# Patient Record
Sex: MALE | Race: WHITE | NOT HISPANIC OR LATINO | Employment: OTHER | URBAN - METROPOLITAN AREA
[De-identification: names, ages, dates, MRNs, and addresses within clinical notes are randomized per-mention and may not be internally consistent; named-entity substitution may affect disease eponyms.]

---

## 2017-11-28 ENCOUNTER — ALLSCRIPTS OFFICE VISIT (OUTPATIENT)
Dept: OTHER | Facility: OTHER | Age: 67
End: 2017-11-28

## 2017-11-28 LAB
BILIRUB UR QL STRIP: NORMAL
CLARITY UR: NORMAL
COLOR UR: YELLOW
GLUCOSE (HISTORICAL): NORMAL
HGB UR QL STRIP.AUTO: NORMAL
KETONES UR STRIP-MCNC: NORMAL MG/DL
LEUKOCYTE ESTERASE UR QL STRIP: NORMAL
NITRITE UR QL STRIP: NORMAL
PH UR STRIP.AUTO: 5.5 [PH]
PROT UR STRIP-MCNC: NORMAL MG/DL
SP GR UR STRIP.AUTO: 1.03
UROBILINOGEN UR QL STRIP.AUTO: 0.2

## 2018-01-22 VITALS
BODY MASS INDEX: 25.91 KG/M2 | HEIGHT: 70 IN | SYSTOLIC BLOOD PRESSURE: 110 MMHG | WEIGHT: 181 LBS | DIASTOLIC BLOOD PRESSURE: 70 MMHG

## 2018-11-28 ENCOUNTER — OFFICE VISIT (OUTPATIENT)
Dept: UROLOGY | Facility: MEDICAL CENTER | Age: 68
End: 2018-11-28

## 2018-11-28 VITALS
HEIGHT: 70 IN | DIASTOLIC BLOOD PRESSURE: 76 MMHG | WEIGHT: 186.5 LBS | SYSTOLIC BLOOD PRESSURE: 140 MMHG | BODY MASS INDEX: 26.7 KG/M2

## 2018-11-28 DIAGNOSIS — C61 MALIGNANT NEOPLASM OF PROSTATE (HCC): ICD-10-CM

## 2018-11-28 DIAGNOSIS — Z85.46 HISTORY OF PROSTATE CANCER: Primary | ICD-10-CM

## 2018-11-28 LAB
SL AMB  POCT GLUCOSE, UA: NORMAL
SL AMB LEUKOCYTE ESTERASE,UA: NORMAL
SL AMB POCT BILIRUBIN,UA: NORMAL
SL AMB POCT BLOOD,UA: NORMAL
SL AMB POCT CLARITY,UA: CLEAR
SL AMB POCT COLOR,UA: YELLOW
SL AMB POCT KETONES,UA: NORMAL
SL AMB POCT NITRITE,UA: NORMAL
SL AMB POCT PH,UA: 7
SL AMB POCT SPECIFIC GRAVITY,UA: 1.02
SL AMB POCT URINE PROTEIN: NORMAL
SL AMB POCT UROBILINOGEN: 0.2

## 2018-11-28 RX ORDER — IBUPROFEN 600 MG/1
600 TABLET ORAL EVERY 6 HOURS PRN
Refills: 0 | COMMUNITY
Start: 2018-11-21

## 2018-11-28 RX ORDER — PAROXETINE HYDROCHLORIDE 20 MG/1
TABLET, FILM COATED ORAL
COMMUNITY

## 2018-11-29 ENCOUNTER — OFFICE VISIT (OUTPATIENT)
Dept: UROLOGY | Facility: MEDICAL CENTER | Age: 68
End: 2018-11-29
Payer: COMMERCIAL

## 2018-11-29 VITALS
HEIGHT: 70 IN | BODY MASS INDEX: 26.63 KG/M2 | SYSTOLIC BLOOD PRESSURE: 136 MMHG | DIASTOLIC BLOOD PRESSURE: 76 MMHG | WEIGHT: 186 LBS

## 2018-11-29 DIAGNOSIS — Z85.46 HISTORY OF PROSTATE CANCER: Primary | ICD-10-CM

## 2018-11-29 PROCEDURE — 99213 OFFICE O/P EST LOW 20 MIN: CPT | Performed by: UROLOGY

## 2018-11-29 NOTE — LETTER
December 3, 2018     Bethanie Frausto MD  [facility] [address]  [address] [facility]    Patient: Jos Zimmer   YOB: 1950   Date of Visit: 11/29/2018       Dear Dr Hany Eric:    Thank you for referring Jos Zimmer to me for evaluation  Below are my notes for this consultation  If you have questions, please do not hesitate to call me  I look forward to following your patient along with you  Sincerely,        Meggan Andrade MD        CC: No Recipients  Meggan Andrade MD  12/3/2018  9:51 AM  Sign at close encounter  Assessment/Plan:  Doing well  Keep checking psa til 10 yr from treatment  FU 1 yr  No problem-specific Assessment & Plan notes found for this encounter  Diagnoses and all orders for this visit:    History of prostate cancer  -     PSA Total, Diagnostic; Future          Subjective:      Patient ID: Jos Zimmer is a 70 y o  male  Follow-up for prostate cancer  Two thousand twelve, one focus of well difference cancer in 1/12 cores  XRT finished in 2012 and has done well since that time  Good urinary stream and control, nocturia x2, sometimes three  However, the nocturia does not bother him very much and he is quite tolerant of it  No rectal irritation from the radiation  0 5 G in a  PSA has always been quite low, most recently        The following portions of the patient's history were reviewed and updated as appropriate: allergies, current medications, past family history, past medical history, past social history, past surgical history and problem list     Review of Systems   All other systems reviewed and are negative  Objective:      /76   Ht 5' 9 5" (1 765 m)   Wt 84 4 kg (186 lb)   BMI 27 07 kg/m²           Physical Exam   Constitutional: He is oriented to person, place, and time  He appears well-developed and well-nourished  No distress  HENT:   Head: Normocephalic and atraumatic     Eyes: Conjunctivae are normal    Cardiovascular: Normal rate and regular rhythm  Pulmonary/Chest: Effort normal and breath sounds normal  No respiratory distress  He has no wheezes  Abdominal: Soft  Bowel sounds are normal  He exhibits no distension and no mass  There is no tenderness  Genitourinary:   Genitourinary Comments: Genitalia normal   Prostate small benign   Neurological: He is alert and oriented to person, place, and time  Skin: Skin is warm and dry  He is not diaphoretic

## 2018-11-29 NOTE — PROGRESS NOTES
Assessment/Plan:  Doing well  Keep checking psa til 10 yr from treatment  FU 1 yr  No problem-specific Assessment & Plan notes found for this encounter  Diagnoses and all orders for this visit:    History of prostate cancer  -     PSA Total, Diagnostic; Future          Subjective:      Patient ID: Aleena Nguyen is a 70 y o  male  Follow-up for prostate cancer  Two thousand twelve, one focus of well difference cancer in 1/12 cores  XRT finished in 2012 and has done well since that time  Good urinary stream and control, nocturia x2, sometimes three  However, the nocturia does not bother him very much and he is quite tolerant of it  No rectal irritation from the radiation  0 5 G in a  PSA has always been quite low, most recently        The following portions of the patient's history were reviewed and updated as appropriate: allergies, current medications, past family history, past medical history, past social history, past surgical history and problem list     Review of Systems   All other systems reviewed and are negative  Objective:      /76   Ht 5' 9 5" (1 765 m)   Wt 84 4 kg (186 lb)   BMI 27 07 kg/m²          Physical Exam   Constitutional: He is oriented to person, place, and time  He appears well-developed and well-nourished  No distress  HENT:   Head: Normocephalic and atraumatic  Eyes: Conjunctivae are normal    Cardiovascular: Normal rate and regular rhythm  Pulmonary/Chest: Effort normal and breath sounds normal  No respiratory distress  He has no wheezes  Abdominal: Soft  Bowel sounds are normal  He exhibits no distension and no mass  There is no tenderness  Genitourinary:   Genitourinary Comments: Genitalia normal   Prostate small benign   Neurological: He is alert and oriented to person, place, and time  Skin: Skin is warm and dry  He is not diaphoretic

## 2018-11-29 NOTE — PATIENT INSTRUCTIONS
We will keep getting the PSA test every six months  In June have a PSA done, and we will get you review a her or with phone report  If you do not hear within one week, call us for the number  He through a good  You will see us in December, another PSA before that appointment

## 2019-06-28 ENCOUNTER — TELEPHONE (OUTPATIENT)
Dept: UROLOGY | Facility: MEDICAL CENTER | Age: 69
End: 2019-06-28

## 2019-12-12 ENCOUNTER — OFFICE VISIT (OUTPATIENT)
Dept: UROLOGY | Facility: MEDICAL CENTER | Age: 69
End: 2019-12-12
Payer: COMMERCIAL

## 2019-12-12 VITALS
SYSTOLIC BLOOD PRESSURE: 132 MMHG | WEIGHT: 175 LBS | BODY MASS INDEX: 25.05 KG/M2 | HEART RATE: 66 BPM | HEIGHT: 70 IN | DIASTOLIC BLOOD PRESSURE: 84 MMHG

## 2019-12-12 DIAGNOSIS — Z85.46 HISTORY OF PROSTATE CANCER: Primary | ICD-10-CM

## 2019-12-12 LAB
SL AMB  POCT GLUCOSE, UA: NORMAL
SL AMB LEUKOCYTE ESTERASE,UA: NORMAL
SL AMB POCT BILIRUBIN,UA: NORMAL
SL AMB POCT BLOOD,UA: NORMAL
SL AMB POCT CLARITY,UA: CLEAR
SL AMB POCT COLOR,UA: YELLOW
SL AMB POCT KETONES,UA: NORMAL
SL AMB POCT NITRITE,UA: NORMAL
SL AMB POCT PH,UA: 5.5
SL AMB POCT SPECIFIC GRAVITY,UA: 1.03
SL AMB POCT URINE PROTEIN: NORMAL
SL AMB POCT UROBILINOGEN: 0.2

## 2019-12-12 PROCEDURE — 99213 OFFICE O/P EST LOW 20 MIN: CPT | Performed by: UROLOGY

## 2019-12-12 PROCEDURE — 81003 URINALYSIS AUTO W/O SCOPE: CPT | Performed by: UROLOGY

## 2019-12-12 NOTE — PROGRESS NOTES
HISTORY:    Follow-up for prostate cancer  Two thousand twelve, one focus of well difference cancer in 1/12 cores      XRT finished in 2012 and has done well since that time  Good urinary stream and control, nocturia x2, sometimes three  However, the nocturia does not bother him very much and he is quite tolerant of it      No rectal irritation from the radiation  PSA has always been quite low, most recently 0 5            ASSESSMENT / PLAN:     doing well  Should have good long-term recovery  Follow-up one year with PSA    The following portions of the patient's history were reviewed and updated as appropriate: allergies, current medications, past family history, past medical history, past social history, past surgical history and problem list     Review of Systems   All other systems reviewed and are negative  Objective:     Physical Exam   Constitutional: He appears well-developed and well-nourished  Genitourinary:   Genitourinary Comments:  Penis testes normal     Prostate moderately enlarged no nodules         No results found for: PSA]  No results found for: BUN  No results found for: CREATININE  No components found for: CBC      Patient Active Problem List   Diagnosis    History of prostate cancer        Diagnoses and all orders for this visit:    History of prostate cancer  -     POCT urine dip auto non-scope  -     PSA Total, Diagnostic; Future           Patient ID: Nura Paez is a 78 y o  male        Current Outpatient Medications:     ibuprofen (MOTRIN) 600 mg tablet, Take 600 mg by mouth every 6 (six) hours as needed, Disp: , Rfl: 0    Multiple Vitamins-Minerals (CENTRUM SILVER 50+MEN PO), Take by mouth, Disp: , Rfl:     PARoxetine (PAXIL) 20 mg tablet, Take by mouth, Disp: , Rfl:     Past Medical History:   Diagnosis Date    Depression     Elevated prostate specific antigen (PSA)     Prostate cancer [facility])        Past Surgical History:   Procedure Laterality Date    COLONOSCOPY      KNEE SURGERY      OTHER SURGICAL HISTORY      device marker       Social History

## 2021-03-31 ENCOUNTER — OFFICE VISIT (OUTPATIENT)
Dept: UROLOGY | Facility: MEDICAL CENTER | Age: 71
End: 2021-03-31
Payer: COMMERCIAL

## 2021-03-31 VITALS
HEART RATE: 75 BPM | WEIGHT: 184 LBS | DIASTOLIC BLOOD PRESSURE: 80 MMHG | HEIGHT: 70 IN | SYSTOLIC BLOOD PRESSURE: 110 MMHG | BODY MASS INDEX: 26.34 KG/M2

## 2021-03-31 DIAGNOSIS — Z85.46 HISTORY OF PROSTATE CANCER: Primary | ICD-10-CM

## 2021-03-31 LAB
SL AMB  POCT GLUCOSE, UA: ABNORMAL
SL AMB LEUKOCYTE ESTERASE,UA: ABNORMAL
SL AMB POCT BILIRUBIN,UA: ABNORMAL
SL AMB POCT BLOOD,UA: ABNORMAL
SL AMB POCT CLARITY,UA: CLEAR
SL AMB POCT COLOR,UA: YELLOW
SL AMB POCT KETONES,UA: ABNORMAL
SL AMB POCT NITRITE,UA: ABNORMAL
SL AMB POCT PH,UA: 7
SL AMB POCT SPECIFIC GRAVITY,UA: 1.02
SL AMB POCT URINE PROTEIN: ABNORMAL
SL AMB POCT UROBILINOGEN: 0.2

## 2021-03-31 PROCEDURE — 81003 URINALYSIS AUTO W/O SCOPE: CPT | Performed by: UROLOGY

## 2021-03-31 PROCEDURE — 99214 OFFICE O/P EST MOD 30 MIN: CPT | Performed by: UROLOGY

## 2021-03-31 RX ORDER — MULTIVITAMIN
1 CAPSULE ORAL
COMMUNITY

## 2021-03-31 NOTE — PROGRESS NOTES
HISTORY:    Follow-up minimal volume prostate cancer, on active surveillance  PSA 0 46 in December 2020, always been low  2012, one focus of well difference cancer in 1/12 cores      XRT finished in 2012 and has done well since that time  Good urinary stream and control, nocturia x2 not bothered at all  ASSESSMENT / PLAN:    Doing well  He has excellent chance for never having a recurrence     At this point we will make our visits p r n  YEARLY PSA AT FAMILY DOCTOR, OTHERWISE FOLLOW-UP AS NEEDED    The following portions of the patient's history were reviewed and updated as appropriate: allergies, current medications, past family history, past medical history, past social history, past surgical history and problem list     Review of Systems   All other systems reviewed and are negative  Objective:     Physical Exam  Constitutional:       General: He is not in acute distress  Appearance: He is well-developed  He is not diaphoretic  HENT:      Head: Normocephalic and atraumatic  Eyes:      General: No scleral icterus  Pulmonary:      Effort: Pulmonary effort is normal    Genitourinary:     Comments: PENIS TESTES NORMAL     PROSTATE MODERATELY ENLARGED BROAD NO NODULE  Skin:     Coloration: Skin is not pale  Neurological:      Mental Status: He is alert and oriented to person, place, and time  Psychiatric:         Behavior: Behavior normal          Thought Content: Thought content normal          Judgment: Judgment normal            No results found for: PSA]  No results found for: BUN  No results found for: CREATININE  No components found for: CBC      Patient Active Problem List   Diagnosis    History of prostate cancer        Diagnoses and all orders for this visit:    History of prostate cancer  -     POCT urine dip auto non-scope    Other orders  -     Multiple Vitamin (multivitamin) capsule;  Take 1 capsule by mouth           Patient ID: Roberta Watts is a 69 y o  male        Current Outpatient Medications:     ibuprofen (MOTRIN) 600 mg tablet, Take 600 mg by mouth every 6 (six) hours as needed, Disp: , Rfl: 0    Multiple Vitamin (multivitamin) capsule, Take 1 capsule by mouth, Disp: , Rfl:     Multiple Vitamins-Minerals (CENTRUM SILVER 50+MEN PO), Take by mouth, Disp: , Rfl:     PARoxetine (PAXIL) 20 mg tablet, Take by mouth, Disp: , Rfl:     Past Medical History:   Diagnosis Date    Depression     Elevated prostate specific antigen (PSA)     Prostate cancer [facility])        Past Surgical History:   Procedure Laterality Date    COLONOSCOPY      KNEE SURGERY      OTHER SURGICAL HISTORY      device marker    SURGERY OF LIP  03/03/2021    melanoma       Social History

## 2021-11-30 PROCEDURE — 88305 TISSUE EXAM BY PATHOLOGIST: CPT | Performed by: PATHOLOGY

## 2021-12-01 ENCOUNTER — LAB REQUISITION (OUTPATIENT)
Dept: LAB | Facility: HOSPITAL | Age: 71
End: 2021-12-01
Payer: MEDICARE

## 2021-12-01 DIAGNOSIS — Z86.010 PERSONAL HISTORY OF COLONIC POLYPS: ICD-10-CM

## 2021-12-01 DIAGNOSIS — Z12.11 ENCOUNTER FOR SCREENING FOR MALIGNANT NEOPLASM OF COLON: ICD-10-CM
